# Patient Record
Sex: MALE | Race: WHITE | NOT HISPANIC OR LATINO | Employment: UNEMPLOYED | URBAN - METROPOLITAN AREA
[De-identification: names, ages, dates, MRNs, and addresses within clinical notes are randomized per-mention and may not be internally consistent; named-entity substitution may affect disease eponyms.]

---

## 2021-01-01 ENCOUNTER — TRANSFERRED RECORDS (OUTPATIENT)
Dept: MULTI SPECIALTY CLINIC | Facility: CLINIC | Age: 56
End: 2021-01-01
Payer: COMMERCIAL

## 2021-01-01 ENCOUNTER — PRE VISIT (OUTPATIENT)
Dept: SURGERY | Facility: CLINIC | Age: 56
End: 2021-01-01

## 2021-01-01 ENCOUNTER — PATIENT OUTREACH (OUTPATIENT)
Dept: SURGERY | Facility: CLINIC | Age: 56
End: 2021-01-01

## 2021-01-01 ENCOUNTER — TELEPHONE (OUTPATIENT)
Dept: SURGERY | Facility: CLINIC | Age: 56
End: 2021-01-01

## 2021-01-01 ENCOUNTER — LAB (OUTPATIENT)
Dept: LAB | Facility: CLINIC | Age: 56
End: 2021-01-01

## 2021-01-01 ENCOUNTER — HOSPITAL ENCOUNTER (OUTPATIENT)
Facility: CLINIC | Age: 56
Discharge: HOME | End: 2021-12-27
Attending: STUDENT IN AN ORGANIZED HEALTH CARE EDUCATION/TRAINING PROGRAM | Admitting: STUDENT IN AN ORGANIZED HEALTH CARE EDUCATION/TRAINING PROGRAM
Payer: COMMERCIAL

## 2021-01-01 ENCOUNTER — ANESTHESIA (OUTPATIENT)
Dept: SURGERY | Facility: CLINIC | Age: 56
End: 2021-01-01
Payer: COMMERCIAL

## 2021-01-01 ENCOUNTER — ANESTHESIA EVENT (OUTPATIENT)
Dept: SURGERY | Facility: CLINIC | Age: 56
End: 2021-01-01
Payer: COMMERCIAL

## 2021-01-01 ENCOUNTER — APPOINTMENT (OUTPATIENT)
Dept: GENERAL RADIOLOGY | Facility: CLINIC | Age: 56
End: 2021-01-01
Attending: STUDENT IN AN ORGANIZED HEALTH CARE EDUCATION/TRAINING PROGRAM
Payer: COMMERCIAL

## 2021-01-01 ENCOUNTER — PREP FOR PROCEDURE (OUTPATIENT)
Dept: SURGERY | Facility: CLINIC | Age: 56
End: 2021-01-01

## 2021-01-01 ENCOUNTER — OFFICE VISIT (OUTPATIENT)
Dept: SURGERY | Facility: CLINIC | Age: 56
End: 2021-01-01
Attending: STUDENT IN AN ORGANIZED HEALTH CARE EDUCATION/TRAINING PROGRAM
Payer: COMMERCIAL

## 2021-01-01 ENCOUNTER — ANCILLARY PROCEDURE (OUTPATIENT)
Dept: CT IMAGING | Facility: CLINIC | Age: 56
End: 2021-01-01
Attending: CLINICAL NURSE SPECIALIST

## 2021-01-01 VITALS
RESPIRATION RATE: 16 BRPM | TEMPERATURE: 98.9 F | WEIGHT: 137.57 LBS | HEIGHT: 65 IN | BODY MASS INDEX: 22.92 KG/M2 | OXYGEN SATURATION: 97 % | HEART RATE: 75 BPM | SYSTOLIC BLOOD PRESSURE: 141 MMHG | DIASTOLIC BLOOD PRESSURE: 94 MMHG

## 2021-01-01 VITALS
DIASTOLIC BLOOD PRESSURE: 68 MMHG | WEIGHT: 141.1 LBS | HEART RATE: 63 BPM | HEIGHT: 65 IN | OXYGEN SATURATION: 100 % | SYSTOLIC BLOOD PRESSURE: 146 MMHG | TEMPERATURE: 98.2 F | RESPIRATION RATE: 18 BRPM | BODY MASS INDEX: 23.51 KG/M2

## 2021-01-01 DIAGNOSIS — Z11.59 ENCOUNTER FOR SCREENING FOR OTHER VIRAL DISEASES: ICD-10-CM

## 2021-01-01 DIAGNOSIS — R91.1 LUNG NODULE: Primary | ICD-10-CM

## 2021-01-01 DIAGNOSIS — C34.80 MALIGNANT NEOPLASM OF OVERLAPPING SITES OF UNSPECIFIED BRONCHUS AND LUNG (H): Primary | ICD-10-CM

## 2021-01-01 DIAGNOSIS — C34.80 MALIGNANT NEOPLASM OF OVERLAPPING SITES OF UNSPECIFIED BRONCHUS AND LUNG (H): ICD-10-CM

## 2021-01-01 LAB
ABO/RH(D): NORMAL
ANTIBODY SCREEN: NEGATIVE
BLD PROD TYP BPU: NORMAL
BLD PROD TYP BPU: NORMAL
BLOOD COMPONENT TYPE: NORMAL
BLOOD COMPONENT TYPE: NORMAL
CODING SYSTEM: NORMAL
CODING SYSTEM: NORMAL
CREATININE (EXTERNAL): 0.8 MG/DL (ref 0.7–1.2)
CROSSMATCH: NORMAL
CROSSMATCH: NORMAL
GFR ESTIMATED (EXTERNAL): >60 ML/MIN/1.73 M2
GLUCOSE (EXTERNAL): 121 MG/DL (ref 74–109)
GLUCOSE BLDC GLUCOMTR-MCNC: 94 MG/DL (ref 70–99)
HOLD SPECIMEN: NORMAL
INR (EXTERNAL): 1.2 (ref 2–3)
INTERPRETATION: NORMAL
ISSUE DATE AND TIME: NORMAL
ISSUE DATE AND TIME: NORMAL
LAB DIRECTOR COMMENTS: NORMAL
LAB DIRECTOR DISCLAIMER: NORMAL
LAB DIRECTOR INTERPRETATION: NORMAL
LAB DIRECTOR METHODOLOGY: NORMAL
LAB DIRECTOR RESULTS: NORMAL
PATH REPORT.COMMENTS IMP SPEC: NORMAL
PATH REPORT.FINAL DX SPEC: NORMAL
PATH REPORT.GROSS SPEC: NORMAL
PATH REPORT.MICROSCOPIC SPEC OTHER STN: NORMAL
PATH REPORT.RELEVANT HX SPEC: NORMAL
PATH REPORT.RELEVANT HX SPEC: NORMAL
PATH REPORT.SITE OF ORIGIN SPEC: NORMAL
POTASSIUM (EXTERNAL): 3.7 MMOL/L (ref 3.5–5)
SIGNIFICANT RESULTS: NORMAL
SPECIMEN DESCRIPTION: NORMAL
SPECIMEN DESCRIPTION: NORMAL
SPECIMEN EXPIRATION DATE: NORMAL
TEST DETAILS, MDL: NORMAL
UNIT ABO/RH: NORMAL
UNIT ABO/RH: NORMAL
UNIT NUMBER: NORMAL
UNIT NUMBER: NORMAL
UNIT STATUS: NORMAL
UNIT STATUS: NORMAL
UNIT TYPE ISBT: 5100
UNIT TYPE ISBT: 5100

## 2021-01-01 PROCEDURE — 250N000011 HC RX IP 250 OP 636: Performed by: REGISTERED NURSE

## 2021-01-01 PROCEDURE — 272N000001 HC OR GENERAL SUPPLY STERILE: Performed by: STUDENT IN AN ORGANIZED HEALTH CARE EDUCATION/TRAINING PROGRAM

## 2021-01-01 PROCEDURE — 999N000065 XR CHEST PORT 1 VIEW

## 2021-01-01 PROCEDURE — G0452 MOLECULAR PATHOLOGY INTERPR: HCPCS | Mod: 26 | Performed by: PATHOLOGY

## 2021-01-01 PROCEDURE — 999N000015 HC STATISTIC ARTERIAL MONITORING DAILY

## 2021-01-01 PROCEDURE — 88312 SPECIAL STAINS GROUP 1: CPT | Mod: 26 | Performed by: PATHOLOGY

## 2021-01-01 PROCEDURE — G0463 HOSPITAL OUTPT CLINIC VISIT: HCPCS

## 2021-01-01 PROCEDURE — 88331 PATH CONSLTJ SURG 1 BLK 1SPC: CPT | Mod: 26 | Performed by: PATHOLOGY

## 2021-01-01 PROCEDURE — 250N000011 HC RX IP 250 OP 636: Performed by: STUDENT IN AN ORGANIZED HEALTH CARE EDUCATION/TRAINING PROGRAM

## 2021-01-01 PROCEDURE — 999N000141 HC STATISTIC PRE-PROCEDURE NURSING ASSESSMENT: Performed by: STUDENT IN AN ORGANIZED HEALTH CARE EDUCATION/TRAINING PROGRAM

## 2021-01-01 PROCEDURE — 88305 TISSUE EXAM BY PATHOLOGIST: CPT | Mod: TC | Performed by: STUDENT IN AN ORGANIZED HEALTH CARE EDUCATION/TRAINING PROGRAM

## 2021-01-01 PROCEDURE — 88341 IMHCHEM/IMCYTCHM EA ADD ANTB: CPT | Mod: 26 | Performed by: PATHOLOGY

## 2021-01-01 PROCEDURE — 88305 TISSUE EXAM BY PATHOLOGIST: CPT | Mod: 26 | Performed by: PATHOLOGY

## 2021-01-01 PROCEDURE — 999N000157 HC STATISTIC RCP TIME EA 10 MIN

## 2021-01-01 PROCEDURE — 710N000010 HC RECOVERY PHASE 1, LEVEL 2, PER MIN: Performed by: STUDENT IN AN ORGANIZED HEALTH CARE EDUCATION/TRAINING PROGRAM

## 2021-01-01 PROCEDURE — 82962 GLUCOSE BLOOD TEST: CPT

## 2021-01-01 PROCEDURE — 81445 SO NEO GSAP 5-50DNA/DNA&RNA: CPT | Performed by: STUDENT IN AN ORGANIZED HEALTH CARE EDUCATION/TRAINING PROGRAM

## 2021-01-01 PROCEDURE — 86850 RBC ANTIBODY SCREEN: CPT | Performed by: ANESTHESIOLOGY

## 2021-01-01 PROCEDURE — 88342 IMHCHEM/IMCYTCHM 1ST ANTB: CPT | Mod: 26 | Performed by: PATHOLOGY

## 2021-01-01 PROCEDURE — 99204 OFFICE O/P NEW MOD 45 MIN: CPT | Performed by: STUDENT IN AN ORGANIZED HEALTH CARE EDUCATION/TRAINING PROGRAM

## 2021-01-01 PROCEDURE — 250N000009 HC RX 250: Performed by: ANESTHESIOLOGY

## 2021-01-01 PROCEDURE — 250N000013 HC RX MED GY IP 250 OP 250 PS 637: Performed by: SURGERY

## 2021-01-01 PROCEDURE — 88321 CONSLTJ&REPRT SLD PREP ELSWR: CPT | Performed by: PATHOLOGY

## 2021-01-01 PROCEDURE — 258N000003 HC RX IP 258 OP 636: Performed by: REGISTERED NURSE

## 2021-01-01 PROCEDURE — 250N000009 HC RX 250: Performed by: REGISTERED NURSE

## 2021-01-01 PROCEDURE — 71250 CT THORAX DX C-: CPT | Mod: GC | Performed by: RADIOLOGY

## 2021-01-01 PROCEDURE — P9016 RBC LEUKOCYTES REDUCED: HCPCS | Performed by: STUDENT IN AN ORGANIZED HEALTH CARE EDUCATION/TRAINING PROGRAM

## 2021-01-01 PROCEDURE — 360N000077 HC SURGERY LEVEL 4, PER MIN: Performed by: STUDENT IN AN ORGANIZED HEALTH CARE EDUCATION/TRAINING PROGRAM

## 2021-01-01 PROCEDURE — 88360 TUMOR IMMUNOHISTOCHEM/MANUAL: CPT | Mod: 26 | Performed by: PATHOLOGY

## 2021-01-01 PROCEDURE — 258N000003 HC RX IP 258 OP 636: Performed by: ANESTHESIOLOGY

## 2021-01-01 PROCEDURE — 71045 X-RAY EXAM CHEST 1 VIEW: CPT | Mod: 26 | Performed by: RADIOLOGY

## 2021-01-01 PROCEDURE — 250N000011 HC RX IP 250 OP 636: Performed by: ANESTHESIOLOGY

## 2021-01-01 PROCEDURE — 88307 TISSUE EXAM BY PATHOLOGIST: CPT | Mod: 26 | Performed by: PATHOLOGY

## 2021-01-01 PROCEDURE — 39402 MEDIASTINOSCPY W/LMPH NOD BX: CPT | Mod: GC | Performed by: STUDENT IN AN ORGANIZED HEALTH CARE EDUCATION/TRAINING PROGRAM

## 2021-01-01 PROCEDURE — 88365 INSITU HYBRIDIZATION (FISH): CPT | Mod: 26 | Performed by: PATHOLOGY

## 2021-01-01 PROCEDURE — 36415 COLL VENOUS BLD VENIPUNCTURE: CPT | Performed by: ANESTHESIOLOGY

## 2021-01-01 PROCEDURE — 370N000017 HC ANESTHESIA TECHNICAL FEE, PER MIN: Performed by: STUDENT IN AN ORGANIZED HEALTH CARE EDUCATION/TRAINING PROGRAM

## 2021-01-01 PROCEDURE — 710N000012 HC RECOVERY PHASE 2, PER MINUTE: Performed by: STUDENT IN AN ORGANIZED HEALTH CARE EDUCATION/TRAINING PROGRAM

## 2021-01-01 PROCEDURE — 86923 COMPATIBILITY TEST ELECTRIC: CPT | Performed by: STUDENT IN AN ORGANIZED HEALTH CARE EDUCATION/TRAINING PROGRAM

## 2021-01-01 PROCEDURE — 88331 PATH CONSLTJ SURG 1 BLK 1SPC: CPT | Mod: TC,XS | Performed by: STUDENT IN AN ORGANIZED HEALTH CARE EDUCATION/TRAINING PROGRAM

## 2021-01-01 PROCEDURE — 88323 CONSLTJ&REPRT MATRL PREP SLD: CPT | Mod: TC | Performed by: STUDENT IN AN ORGANIZED HEALTH CARE EDUCATION/TRAINING PROGRAM

## 2021-01-01 PROCEDURE — 250N000025 HC SEVOFLURANE, PER MIN: Performed by: STUDENT IN AN ORGANIZED HEALTH CARE EDUCATION/TRAINING PROGRAM

## 2021-01-01 RX ORDER — ONDANSETRON 4 MG/1
4 TABLET, ORALLY DISINTEGRATING ORAL EVERY 30 MIN PRN
Status: DISCONTINUED | OUTPATIENT
Start: 2021-01-01 | End: 2021-01-01 | Stop reason: HOSPADM

## 2021-01-01 RX ORDER — ACETAMINOPHEN 325 MG/1
650 TABLET ORAL
Status: DISCONTINUED | OUTPATIENT
Start: 2021-01-01 | End: 2021-01-01 | Stop reason: HOSPADM

## 2021-01-01 RX ORDER — CEFAZOLIN SODIUM 2 G/100ML
2 INJECTION, SOLUTION INTRAVENOUS
Status: COMPLETED | OUTPATIENT
Start: 2021-01-01 | End: 2021-01-01

## 2021-01-01 RX ORDER — ACETAMINOPHEN 325 MG/1
650 TABLET ORAL EVERY 4 HOURS PRN
Qty: 50 TABLET | Refills: 0 | Status: SHIPPED | OUTPATIENT
Start: 2021-01-01

## 2021-01-01 RX ORDER — SODIUM CHLORIDE, SODIUM LACTATE, POTASSIUM CHLORIDE, CALCIUM CHLORIDE 600; 310; 30; 20 MG/100ML; MG/100ML; MG/100ML; MG/100ML
INJECTION, SOLUTION INTRAVENOUS CONTINUOUS PRN
Status: DISCONTINUED | OUTPATIENT
Start: 2021-01-01 | End: 2021-01-01

## 2021-01-01 RX ORDER — LIDOCAINE 40 MG/G
CREAM TOPICAL
Status: DISCONTINUED | OUTPATIENT
Start: 2021-01-01 | End: 2021-01-01 | Stop reason: HOSPADM

## 2021-01-01 RX ORDER — OXYCODONE HYDROCHLORIDE 5 MG/1
5 TABLET ORAL
Status: COMPLETED | OUTPATIENT
Start: 2021-01-01 | End: 2021-01-01

## 2021-01-01 RX ORDER — PROPOFOL 10 MG/ML
INJECTION, EMULSION INTRAVENOUS PRN
Status: DISCONTINUED | OUTPATIENT
Start: 2021-01-01 | End: 2021-01-01

## 2021-01-01 RX ORDER — CITALOPRAM HYDROBROMIDE 20 MG/1
20 TABLET ORAL
COMMUNITY

## 2021-01-01 RX ORDER — OXYCODONE HYDROCHLORIDE 5 MG/1
5 TABLET ORAL EVERY 4 HOURS PRN
Status: DISCONTINUED | OUTPATIENT
Start: 2021-01-01 | End: 2021-01-01 | Stop reason: HOSPADM

## 2021-01-01 RX ORDER — FENTANYL CITRATE 50 UG/ML
INJECTION, SOLUTION INTRAMUSCULAR; INTRAVENOUS PRN
Status: DISCONTINUED | OUTPATIENT
Start: 2021-01-01 | End: 2021-01-01

## 2021-01-01 RX ORDER — CEFAZOLIN SODIUM 2 G/50ML
2 SOLUTION INTRAVENOUS SEE ADMIN INSTRUCTIONS
Status: CANCELLED | OUTPATIENT
Start: 2021-01-01

## 2021-01-01 RX ORDER — OXYCODONE HYDROCHLORIDE 5 MG/1
5-10 TABLET ORAL EVERY 4 HOURS PRN
Qty: 20 TABLET | Refills: 0 | Status: SHIPPED | OUTPATIENT
Start: 2021-01-01

## 2021-01-01 RX ORDER — KETAMINE HYDROCHLORIDE 10 MG/ML
INJECTION INTRAMUSCULAR; INTRAVENOUS PRN
Status: DISCONTINUED | OUTPATIENT
Start: 2021-01-01 | End: 2021-01-01

## 2021-01-01 RX ORDER — ONDANSETRON 2 MG/ML
INJECTION INTRAMUSCULAR; INTRAVENOUS PRN
Status: DISCONTINUED | OUTPATIENT
Start: 2021-01-01 | End: 2021-01-01

## 2021-01-01 RX ORDER — FENTANYL CITRATE 50 UG/ML
25 INJECTION, SOLUTION INTRAMUSCULAR; INTRAVENOUS EVERY 5 MIN PRN
Status: DISCONTINUED | OUTPATIENT
Start: 2021-01-01 | End: 2021-01-01 | Stop reason: HOSPADM

## 2021-01-01 RX ORDER — EPHEDRINE SULFATE 50 MG/ML
INJECTION, SOLUTION INTRAMUSCULAR; INTRAVENOUS; SUBCUTANEOUS PRN
Status: DISCONTINUED | OUTPATIENT
Start: 2021-01-01 | End: 2021-01-01

## 2021-01-01 RX ORDER — GLYCOPYRROLATE 0.2 MG/ML
INJECTION, SOLUTION INTRAMUSCULAR; INTRAVENOUS PRN
Status: DISCONTINUED | OUTPATIENT
Start: 2021-01-01 | End: 2021-01-01

## 2021-01-01 RX ORDER — CEFAZOLIN SODIUM 2 G/50ML
2 SOLUTION INTRAVENOUS
Status: CANCELLED | OUTPATIENT
Start: 2021-01-01

## 2021-01-01 RX ORDER — DEXAMETHASONE SODIUM PHOSPHATE 4 MG/ML
INJECTION, SOLUTION INTRA-ARTICULAR; INTRALESIONAL; INTRAMUSCULAR; INTRAVENOUS; SOFT TISSUE PRN
Status: DISCONTINUED | OUTPATIENT
Start: 2021-01-01 | End: 2021-01-01

## 2021-01-01 RX ORDER — CEFAZOLIN SODIUM 2 G/100ML
2 INJECTION, SOLUTION INTRAVENOUS SEE ADMIN INSTRUCTIONS
Status: DISCONTINUED | OUTPATIENT
Start: 2021-01-01 | End: 2021-01-01 | Stop reason: HOSPADM

## 2021-01-01 RX ORDER — HYDROMORPHONE HYDROCHLORIDE 1 MG/ML
0.2 INJECTION, SOLUTION INTRAMUSCULAR; INTRAVENOUS; SUBCUTANEOUS EVERY 5 MIN PRN
Status: DISCONTINUED | OUTPATIENT
Start: 2021-01-01 | End: 2021-01-01 | Stop reason: HOSPADM

## 2021-01-01 RX ORDER — ONDANSETRON 2 MG/ML
4 INJECTION INTRAMUSCULAR; INTRAVENOUS EVERY 30 MIN PRN
Status: DISCONTINUED | OUTPATIENT
Start: 2021-01-01 | End: 2021-01-01 | Stop reason: HOSPADM

## 2021-01-01 RX ORDER — SODIUM CHLORIDE, SODIUM LACTATE, POTASSIUM CHLORIDE, CALCIUM CHLORIDE 600; 310; 30; 20 MG/100ML; MG/100ML; MG/100ML; MG/100ML
INJECTION, SOLUTION INTRAVENOUS CONTINUOUS
Status: DISCONTINUED | OUTPATIENT
Start: 2021-01-01 | End: 2021-01-01 | Stop reason: HOSPADM

## 2021-01-01 RX ORDER — LIDOCAINE HYDROCHLORIDE 20 MG/ML
INJECTION, SOLUTION INFILTRATION; PERINEURAL PRN
Status: DISCONTINUED | OUTPATIENT
Start: 2021-01-01 | End: 2021-01-01

## 2021-01-01 RX ADMIN — FENTANYL CITRATE 100 MCG: 50 INJECTION, SOLUTION INTRAMUSCULAR; INTRAVENOUS at 10:54

## 2021-01-01 RX ADMIN — ROCURONIUM BROMIDE 50 MG: 50 INJECTION, SOLUTION INTRAVENOUS at 10:24

## 2021-01-01 RX ADMIN — FENTANYL CITRATE 25 MCG: 50 INJECTION, SOLUTION INTRAMUSCULAR; INTRAVENOUS at 14:27

## 2021-01-01 RX ADMIN — ONDANSETRON 4 MG: 2 INJECTION INTRAMUSCULAR; INTRAVENOUS at 13:12

## 2021-01-01 RX ADMIN — HYDROMORPHONE HYDROCHLORIDE 0.2 MG: 1 INJECTION, SOLUTION INTRAMUSCULAR; INTRAVENOUS; SUBCUTANEOUS at 14:24

## 2021-01-01 RX ADMIN — ROCURONIUM BROMIDE 20 MG: 50 INJECTION, SOLUTION INTRAVENOUS at 11:44

## 2021-01-01 RX ADMIN — Medication 10 MG: at 11:34

## 2021-01-01 RX ADMIN — SODIUM CHLORIDE, POTASSIUM CHLORIDE, SODIUM LACTATE AND CALCIUM CHLORIDE: 600; 310; 30; 20 INJECTION, SOLUTION INTRAVENOUS at 10:08

## 2021-01-01 RX ADMIN — ROCURONIUM BROMIDE 10 MG: 50 INJECTION, SOLUTION INTRAVENOUS at 13:34

## 2021-01-01 RX ADMIN — PHENYLEPHRINE HYDROCHLORIDE 50 MCG: 10 INJECTION INTRAVENOUS at 13:13

## 2021-01-01 RX ADMIN — PROPOFOL 140 MG: 10 INJECTION, EMULSION INTRAVENOUS at 10:24

## 2021-01-01 RX ADMIN — PHENYLEPHRINE HYDROCHLORIDE 50 MCG: 10 INJECTION INTRAVENOUS at 12:36

## 2021-01-01 RX ADMIN — HYDROMORPHONE HYDROCHLORIDE 0.2 MG: 1 INJECTION, SOLUTION INTRAMUSCULAR; INTRAVENOUS; SUBCUTANEOUS at 14:52

## 2021-01-01 RX ADMIN — Medication 30 MG: at 10:24

## 2021-01-01 RX ADMIN — ROCURONIUM BROMIDE 20 MG: 50 INJECTION, SOLUTION INTRAVENOUS at 12:41

## 2021-01-01 RX ADMIN — Medication 10 MG: at 12:43

## 2021-01-01 RX ADMIN — PROPOFOL 30 MG: 10 INJECTION, EMULSION INTRAVENOUS at 10:18

## 2021-01-01 RX ADMIN — PHENYLEPHRINE HYDROCHLORIDE 100 MCG: 10 INJECTION INTRAVENOUS at 10:42

## 2021-01-01 RX ADMIN — REMIFENTANIL HYDROCHLORIDE 0.02 MCG/KG/MIN: 1 INJECTION, POWDER, LYOPHILIZED, FOR SOLUTION INTRAVENOUS at 10:36

## 2021-01-01 RX ADMIN — ENOXAPARIN SODIUM 40 MG: 40 INJECTION SUBCUTANEOUS at 08:46

## 2021-01-01 RX ADMIN — SUGAMMADEX 200 MG: 100 INJECTION, SOLUTION INTRAVENOUS at 14:08

## 2021-01-01 RX ADMIN — Medication 5 MG: at 11:00

## 2021-01-01 RX ADMIN — LIDOCAINE HYDROCHLORIDE 100 MG: 20 INJECTION, SOLUTION INFILTRATION; PERINEURAL at 10:24

## 2021-01-01 RX ADMIN — ROCURONIUM BROMIDE 30 MG: 50 INJECTION, SOLUTION INTRAVENOUS at 10:51

## 2021-01-01 RX ADMIN — ACETAMINOPHEN 650 MG: 325 TABLET, FILM COATED ORAL at 14:50

## 2021-01-01 RX ADMIN — OXYCODONE HYDROCHLORIDE 5 MG: 5 TABLET ORAL at 14:50

## 2021-01-01 RX ADMIN — DEXAMETHASONE SODIUM PHOSPHATE 8 MG: 4 INJECTION, SOLUTION INTRA-ARTICULAR; INTRALESIONAL; INTRAMUSCULAR; INTRAVENOUS; SOFT TISSUE at 10:52

## 2021-01-01 RX ADMIN — FENTANYL CITRATE 25 MCG: 50 INJECTION, SOLUTION INTRAMUSCULAR; INTRAVENOUS at 15:22

## 2021-01-01 RX ADMIN — GLYCOPYRROLATE 0.2 MG: 0.2 INJECTION, SOLUTION INTRAMUSCULAR; INTRAVENOUS at 11:09

## 2021-01-01 RX ADMIN — MIDAZOLAM 2 MG: 1 INJECTION INTRAMUSCULAR; INTRAVENOUS at 09:50

## 2021-01-01 RX ADMIN — CEFAZOLIN 2 G: 10 INJECTION, POWDER, FOR SOLUTION INTRAVENOUS at 10:43

## 2021-01-01 RX ADMIN — FENTANYL CITRATE 100 MCG: 50 INJECTION, SOLUTION INTRAMUSCULAR; INTRAVENOUS at 10:24

## 2021-01-01 ASSESSMENT — MIFFLIN-ST. JEOR
SCORE: 1380.26
SCORE: 1396.91

## 2021-01-01 ASSESSMENT — PAIN SCALES - GENERAL: PAINLEVEL: MODERATE PAIN (4)

## 2021-12-03 ENCOUNTER — TRANSFERRED RECORDS (OUTPATIENT)
Dept: HEALTH INFORMATION MANAGEMENT | Facility: CLINIC | Age: 56
End: 2021-12-03

## 2021-12-06 ENCOUNTER — TRANSCRIBE ORDERS (OUTPATIENT)
Dept: OTHER | Age: 56
End: 2021-12-06

## 2021-12-06 ENCOUNTER — DOCUMENTATION ONLY (OUTPATIENT)
Dept: ONCOLOGY | Facility: CLINIC | Age: 56
End: 2021-12-06

## 2021-12-06 DIAGNOSIS — C34.80 MALIGNANT NEOPLASM OF OVERLAPPING SITES OF UNSPECIFIED BRONCHUS AND LUNG (H): Primary | ICD-10-CM

## 2021-12-06 NOTE — PROGRESS NOTES
Action 2021 10:54 AM ABT   [facility] for upload     Action 2021 1:57 PM ABT   Action Taken Imaging disc request sent to [facility] for CT Chest 10/21/21  [employer] Track[employer]    Image request sent to [facility] for 17: CT Angio      Action 2021 10:09 AM ABT   Action Taken Images from [facility] received and resolved to PACS.    Called and spoke to at [facility] in regards to imaging disc. Radiology team not available. Left  for Radiology team to call back in regards to status on img disc.    11:45 AM  [facility] and stated they did not receive any requests but will overnight imaging disc with 10/21/21 CT Chest    12:25 PM  [facility] from [facility] received and sent to [facility] for upload.

## 2021-12-14 NOTE — TELEPHONE ENCOUNTER
RECORDS STATUS - ALL OTHER DIAGNOSIS      Action    Action Taken 21  Spoke [facility] Rad: per them, an image disc was overnighted to us this morning. They did not have a tracking number, but indicated the Mail Code of [id number].       RECORDS RECEIVED FROM: MICHAEL Manzanares   DATE RECEIVED:    NOTES STATUS DETAILS   OFFICE NOTE from [facility] provider [facility]    OFFICE NOTE from medical oncologist     DISCHARGE SUMMARY from hospital     DISCHARGE REPORT from the ER     OPERATIVE REPORT CE - [facility] 21: EBUS   MEDICATION LIST     CLINICAL TRIAL TREATMENTS TO DATE     LABS     PATHOLOGY REPORTS Glenna, Report in CE, Slides requested   [employer] Track[employer] 21: [id number]   ANYTHING RELATED TO DIAGNOSIS     GENONOMIC TESTING     TYPE:     IMAGING (NEED IMAGES & REPORT)     CT SCANS Requested 10/21/21, [facility]   MRI     MAMMO     ULTRASOUND     PET PACS 10/28/21: [facility]

## 2021-12-16 NOTE — NURSING NOTE
"Oncology Rooming Note    December 16, 2021 12:03 PM   Satnam Ibarra is a 56 year old male who presents for:    Chief Complaint   Patient presents with     Oncology Clinic Visit     Pt is here for a New Eval for Malignant Neoplasm Lung      Initial Vitals: Blood Pressure (Abnormal) 146/68   Pulse 63   Temperature 98.2  F (36.8  C) (Oral)   Respiration 18   Height 1.651 m (5' 5\")   Weight 64 kg (141 lb 1.6 oz)   Oxygen Saturation 100%   Body Mass Index 23.48 kg/m   Estimated body mass index is 23.48 kg/m  as calculated from the following:    Height as of this encounter: 1.651 m (5' 5\").    Weight as of this encounter: 64 kg (141 lb 1.6 oz). Body surface area is 1.71 meters squared.  Moderate Pain (4) Comment: Trunk of body   No LMP for male patient.  Allergies reviewed: Yes  Medications reviewed: Yes    Medications: Medication refills not needed today.  Pharmacy name entered into EPIC: Data Unavailable    Clinical concerns: none       Angelica Rowland MA            "

## 2021-12-16 NOTE — LETTER
12/16/2021         RE: Satanm Ibarra  [id number] [address] [address]        Dear Colleague,    Thank you for referring your patient, Satnam Ibarra, to the [facility]. Please see a copy of my visit note below.    THORACIC SURGERY - NEW PATIENT OFFICE VISIT          I saw Fred in consultation for the evaluation and treatment of a RUL lung nodule with mediastinal adenopathy.     HPI  Mr. Satnam Ibarra is a 56 year old man with a newly diagnosed lung cancer with mediastinal adenopathy. His main complaints of chest and neck pain had prompted a CT scan which led to the findings of the lung nodule and nodes. A bronchoscopic biopsy of the 4R node was diagnostic of cancer but the type hasnt been defined.  He is here today to discuss obtaining more tissue for diagnosis and treatment planning.  He is quite independednt and functional. He does get dyspneic on exertion.                                   Previsit Tests   PET 11/21: avid RUL nodule and mediastinal nodes    Pathology: thoracic SMARCA4 deficient undifferentiated tumor    PMH      No past medical history on file.     PSH      No past surgical history on file.     ETOH quit 2 yrs ago  TOB1 PPD    Physical examination  BMI 23   no obvious [id number]    From a personal perspective, he lives alone and has no immediate family contact    IMPRESSION   56 year old year-old male with likely advanced lung cancer/thoracic malignancy    PLAN  I spent 45 min on the date of the encounter in chart review, patient visit, review of tests, documentation and/or discussion with other providers about the issues documented above. I reviewed the plan as follows:  Procedure planned: TEMLA   I reviewed the indications, risks, and benefits of the procedure with Mr. Satnam Ibarra. We discussed the intraoperative risks of bleeding and injury to vital organs, potential postoperative complications including, but not limited to, major  respiratory events, arrhythmia, bleeding needing sternotomy, infection, reoperation, and death. I explained the anticipated hospital course (+/- 0-2 days) and postoperative recovery including pain control, possible chest drain management, and variable degrees of dyspnea (or need for supplemental oxygen) and fatigue that tend to get better with time.  .  Consent: pending   Necessary Preop Tests & Appointments:   PAC  PFTs    Regional Anesthesia Plan:   Anticoagulation Plan: lovenox in holding and post op   Smoking Cessation:   Mr. Ibarra was counseled on the importance of smoking cessation and its impact on the tawanna-operative course. The patient is strongly encouraged to quit smoking for 3 weeks prior to their procedure. If they feel they're absolutely unable to quit, we will proceed as planned given the malignant nature of their disease.  This guideline is in accordance with the World Health Organization (WHO) and has shown to lower the risk of both surgical and anesthesia complications as well as improve post-operative outcomes.     All questions were answered and Satnam Ibarra and present family were in agreement with the plan.  I appreciate the opportunity to participate in the care of your patient and will keep you updated.  Sincerely,    Shyanne Eduardo MD               Again, thank you for allowing me to participate in the care of your patient.        Sincerely,        Shyanne Eduardo MD

## 2021-12-17 NOTE — PROGRESS NOTES
THORACIC SURGERY - NEW PATIENT OFFICE VISIT          I saw Fred in consultation for the evaluation and treatment of a RUL lung nodule with mediastinal adenopathy.     HPI  Mr. Satnam Ibarra is a 56 year old man with a newly diagnosed lung cancer with mediastinal adenopathy. His main complaints of chest and neck pain had prompted a CT scan which led to the findings of the lung nodule and nodes. A bronchoscopic biopsy of the 4R node was diagnostic of cancer but the type hasnt been defined.  He is here today to discuss obtaining more tissue for diagnosis and treatment planning.  He is quite independednt and functional. He does get dyspneic on exertion.                                   Previsit Tests   PET 11/21: avid RUL nodule and mediastinal nodes    Pathology: thoracic SMARCA4 deficient undifferentiated tumor    PMH      No past medical history on file.     PSH      No past surgical history on file.     ETOH quit 2 yrs ago  TOB1 PPD    Physical examination  BMI 23   no obvious [id number]    From a personal perspective, he lives alone and has no immediate family contact    IMPRESSION   56 year old year-old male with likely advanced lung cancer/thoracic malignancy    PLAN  I spent 45 min on the date of the encounter in chart review, patient visit, review of tests, documentation and/or discussion with other providers about the issues documented above. I reviewed the plan as follows:  Procedure planned: TEMLA   I reviewed the indications, risks, and benefits of the procedure with Mr. Satnam Ibarra. We discussed the intraoperative risks of bleeding and injury to vital organs, potential postoperative complications including, but not limited to, major respiratory events, arrhythmia, bleeding needing sternotomy, infection, reoperation, and death. I explained the anticipated hospital course (+/- 0-2 days) and postoperative recovery including pain control, possible chest drain management, and variable degrees of  dyspnea (or need for supplemental oxygen) and fatigue that tend to get better with time.  .  Consent: pending   Necessary Preop Tests & Appointments:   PAC  PFTs    Regional Anesthesia Plan:   Anticoagulation Plan: lovenox in holding and post op   Smoking Cessation:   Mr. Ibarra was counseled on the importance of smoking cessation and its impact on the tawanna-operative course. The patient is strongly encouraged to quit smoking for 3 weeks prior to their procedure. If they feel they're absolutely unable to quit, we will proceed as planned given the malignant nature of their disease.  This guideline is in accordance with the World Health Organization (WHO) and has shown to lower the risk of both surgical and anesthesia complications as well as improve post-operative outcomes.     All questions were answered and Satnam Ibarra and present family were in agreement with the plan.  I appreciate the opportunity to participate in the care of your patient and will keep you updated.  Sincerely,    Shyanne Eduardo MD

## 2021-12-21 NOTE — TELEPHONE ENCOUNTER
"Patient scheduled to have Thoracic Surgery Monday December 27 at [facility] with Dr Eduardo.  At time surgery was scheduled, patient requested to have pre-operative H&P and Covid tests completed locally, patient lives 4 hours away. Writer followed up with patient to check on status of pre-operative H&P and Covid Test appointments.  Patient reports he has labs, pre-op H&P and Covid test scheduled locally at [facility] for Thursday December 23.    During conversation patient verbalized concerns he has regarding anesthesia and patient's sobriety.  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 Patient reports during a previous procedure, it took \"forever to come out of anesthesia, he doesn't remember much if anything, even after he was home\". Patient states he doesn't want to be \"over juiced\" or have anything done that could affect his sobriety.  Writer updated the Surgery Scheduler, who reported that they will update the Case report to include this information.  Writer also updated patient's surgeon Dr Eduardo.  Dr Eduardo requested patient's PCP be informed and information reviewed with patient and be included in patient's Pre-op H&P.  Writer will contact patient's PCP.   "

## 2021-12-21 NOTE — TELEPHONE ENCOUNTER
Spoke with patient to schedule procedure with Dr. Shyanne Eduardo    Procedure was scheduled on 12/27 at [facility] OR  [facility] [place] [facility]    Patient is aware a COVID-19 test is needed before their procedure. The test should be with-in 4 days of their procedure.   Test Details: [facility]uled at [facility] [place] on [facility]    Patient is aware a / is needed day of surgery.   Patient has my direct contact information for any further questions.

## 2021-12-22 NOTE — TELEPHONE ENCOUNTER
Writer reached out to [facility] to get in touch with Patient's PCP per Dr Eduardo's request. Pt's PCP is Dr Paul Sharma. Writer updated staff in Dr Sharma's office of patient's concerns and Dr Eduardo's request that they be discussed at pre-op H&P visit. Note from 12/21/2021 with patient's concerns documented faxed to Dr Sharma's office at [phone].

## 2021-12-24 NOTE — TELEPHONE ENCOUNTER
Upon reviewing patient's Pre-operative H&P that was completed by Dr Paul Sharma at the [facility] December 23, it was noted that patient uses cannabis/marijuana daily. Writer attempted to reach out to patient to inform him that it is crucial for him to Refrain from any cannabis/marijuana use a minimum of 48 hours prior to surgery.  No answer when called, left voicemail. Due to the holiday and surgery scheduled for Monday, writer Sent email with this information to patient's email address, [email or web address]  Writer called patient and left another voicemail stating email was sent and encouraged patient to review email.   
Yes

## 2021-12-27 NOTE — ANESTHESIA PROCEDURE NOTES
Airway       Patient location during procedure: OR       Procedure Start/Stop Times: 12/27/2021 10:27 AM  Staff -        Anesthesiologist:  Santino Connors MD       CRNA: Sweta Romero APRN CRNA       Performed By: CRNAIndications and Patient Condition       Indications for airway management: tawanna-procedural       Induction type:intravenous       Mask difficulty assessment: 2 - vent by mask + OA or adjuvant +/- NMBA    Final Airway Details       Final airway type: endotracheal airway       Successful airway: ETT - single  Endotracheal Airway Details        ETT size (mm): 7.5       Cuffed: yes       Successful intubation technique: direct laryngoscopy       DL Blade Type: MAC 4       Grade View of Cords: 2       Adjucts: stylet       Position: Left       Measured from: lips       Secured at (cm): 22       Bite block used: None    Post intubation assessment        Placement verified by: capnometry, equal breath sounds and chest rise        Number of attempts at approach: 1       Number of other approaches attempted: 0       Secured with: pink tape       Ease of procedure: easy       Dentition: Intact and Unchanged

## 2021-12-27 NOTE — ANESTHESIA PREPROCEDURE EVALUATION
Anesthesia Pre-Procedure Evaluation    Patient: Satnam Ibarra   MRN: [id number] : 1965        Preoperative Diagnosis: Lung nodule [R91.1]    Procedure : Procedure(s):  LYMPHADENECTOMY, MEDIASTINUM, EXTENDED, TRANSCERVICAL APPROACH          Past Medical History:   Diagnosis Date     Anxiety      Depression       Past Surgical History:   Procedure Laterality Date     BACK SURGERY        No Known Allergies   Social History     Tobacco Use     Smoking status: Current Every Day Smoker     Smokeless tobacco: Never Used   Substance Use Topics     Alcohol use: Not Currently     Comment: Sober 2+ years      Wt Readings from Last 1 Encounters:   21 62.4 kg (137 lb 9.1 oz)        Anesthesia Evaluation   Pt has had prior anesthetic. Type: General.    History of anesthetic complications   Slow wake..    ROS/MED HX  ENT/Pulmonary:       Neurologic:       Cardiovascular:       METS/Exercise Tolerance:     Hematologic:       Musculoskeletal:       GI/Hepatic:       Renal/Genitourinary:       Endo:       Psychiatric/Substance Use:       Infectious Disease:       Malignancy:       Other:               OUTSIDE LABS:  CBC: No results found for: WBC, HGB, HCT, PLT  BMP:   Lab Results   Component Value Date    GLC 94 2021     COAGS: No results found for: PTT, INR, FIBR  POC: No results found for: BGM, HCG, HCGS  HEPATIC: No results found for: ALBUMIN, PROTTOTAL, ALT, AST, GGT, ALKPHOS, BILITOTAL, BILIDIRECT, GUERITA  OTHER: No results found for: PH, LACT, A1C, ARIEL, PHOS, MAG, LIPASE, AMYLASE, TSH, T4, T3, CRP, SED    Anesthesia Plan    ASA Status:  3      Anesthesia Type: General.     - Airway: ETT         Techniques and Equipment:     - Lines/Monitors: 2nd IV, Arterial Line     Consents    Anesthesia Plan(s) and associated risks, benefits, and realistic alternatives discussed. Questions answered and patient/representative(s) expressed understanding.    - Discussed:     - Discussed with:  Patient      - Extended  Intubation/Ventilatory Support Discussed: No.      - Patient is DNR/DNI Status: No    Use of blood products discussed: No .     Postoperative Care    Pain management: IV analgesics.   PONV prophylaxis: Ondansetron (or other 5HT-3), Dexamethasone or Solumedrol     Comments:                Santino Connors MD

## 2021-12-27 NOTE — PROVIDER NOTIFICATION
Pt expressing desire to leave. Provider made aware. Md Elvin AGUERO Called back, pt ok to discharge at this time.

## 2021-12-27 NOTE — BRIEF OP NOTE
[facility]    Brief Operative Note    Pre-operative diagnosis: Lung nodule [R91.1]  Post-operative diagnosis Same as pre-operative diagnosis    Procedure: Procedure(s):  TRANSCERVICAL EXTENDED MEDIASTINAL LYMPH NODE BIOPSIES  Surgeon: Surgeon(s) and Role:     * Shyanne Eduardo MD - Primary     * Joby Oconnor MD - Resident - Assisting  Anesthesia: General   Estimated Blood Loss: Less than 50 ml    Drains: None  Specimens:   ID Type Source Tests Collected by Time Destination   1 : LEVEL 7  Tissue Lymph Node(s), Mediastinal, Regional SURGICAL PATHOLOGY EXAM Shyanne Eduardo MD 12/27/2021 11:21 AM    2 : 4R Tissue Lymph Node(s) SURGICAL PATHOLOGY EXAM Shyanne Eduardo MD 12/27/2021 11:38 AM    3 : 4R #2 Tissue Lymph Node(s) SURGICAL PATHOLOGY EXAM Shyanne Eduardo MD 12/27/2021 12:10 PM    4 : 4R No. 3  Tissue Lymph Node(s) SURGICAL PATHOLOGY EXAM Shyanne Eduardo MD 12/27/2021 12:48 PM    5 : 4R No. 4 Tissue Lymph Node(s) SURGICAL PATHOLOGY EXAM Shyanne Eduardo MD 12/27/2021  1:02 PM    6 : 4R No. 4 Tissue Lymph Node(s) SURGICAL PATHOLOGY EXAM Shyanne Eduardo MD 12/27/2021  1:05 PM    7 : 4R No. 5 Tissue Lymph Node(s) SURGICAL PATHOLOGY EXAM Shyanne Eduardo MD 12/27/2021  1:45 PM      Findings:   None.  Complications: None.  Implants: * No implants in log *     Signed:    Joby Oconnor MD 12/27/2021 at 2:08 PM  Cardiothoracic Surgery Fellow  Pager: [phone]

## 2021-12-27 NOTE — ANESTHESIA PROCEDURE NOTES
Arterial Line Procedure Note    Pre-Procedure   Staff -        Anesthesiologist:  Santino Connors MD       Performed By: anesthesiologist       Location: OR       Pre-Anesthestic Checklist: patient identified, IV checked, risks and benefits discussed, informed consent, monitors and equipment checked, pre-op evaluation and at physician/surgeon's request  Timeout:       Correct Patient: Yes        Correct Procedure: Yes        Correct Site: Yes        Correct Position: Yes   Procedure   Procedure: arterial line       Laterality: right       Insertion Site: radial.  Sterile Prep        Standard elements of sterile barrier followed       Skin prep: Chloraprep  Insertion/Injection        Technique: Seldinger Technique        Catheter Type/Size: 20 G, 12 cm  Narrative        Tegaderm dressing used.       Complications: None apparent,      Arterial waveform: Yes

## 2021-12-27 NOTE — ANESTHESIA CARE TRANSFER NOTE
Patient: Satnam Ibarra    Procedure: Procedure(s):  TRANSCERVICAL EXTENDED MEDIASTINAL LYMPH NODE BIOPSIES       Diagnosis: Lung nodule [R91.1]  Diagnosis Additional Information: No value filed.    Anesthesia Type:   General     Note:    Oropharynx: oropharynx clear of all foreign objects  Level of Consciousness: awake and drowsy  Oxygen Supplementation: nasal cannula  Level of Supplemental Oxygen (L/min / FiO2): 2  Independent Airway: airway patency satisfactory and stable  Dentition: dentition unchanged  Vital Signs Stable: post-procedure vital signs reviewed and stable  Report to RN Given: handoff report given  Patient transferred to: PACU    Handoff Report: Identifed the Patient, Identified the Reponsible Provider, Reviewed the pertinent medical history, Discussed the surgical course, Reviewed Intra-OP anesthesia mangement and issues during anesthesia, Set expectations for post-procedure period and Allowed opportunity for questions and acknowledgement of understanding      Vitals:  Vitals Value Taken Time   /82 12/27/21 1416   Temp     Pulse 89 12/27/21 1422   Resp     SpO2 97 % 12/27/21 1422   Vitals shown include unvalidated device data.    Electronically Signed By: FLORENCIA Hunter CRNA  December 27, 2021  2:22 PM

## 2021-12-28 NOTE — ANESTHESIA POSTPROCEDURE EVALUATION
Patient: Satnam Ibarra    Procedure: Procedure(s):  TRANSCERVICAL EXTENDED MEDIASTINAL LYMPH NODE BIOPSIES       Diagnosis:Lung nodule [R91.1]  Diagnosis Additional Information: No value filed.    Anesthesia Type:  General    Note:  Disposition: Inpatient   Postop Pain Control: Uneventful            Sign Out: Well controlled pain   PONV: No   Neuro/Psych: Uneventful            Sign Out: Acceptable/Baseline neuro status   Airway/Respiratory: Uneventful            Sign Out: Acceptable/Baseline resp. status   CV/Hemodynamics: Uneventful            Sign Out: Acceptable CV status; No obvious hypovolemia; No obvious fluid overload   Other NRE: NONE   DID A NON-ROUTINE EVENT OCCUR? No           Last vitals:  Vitals Value Taken Time   /89 12/27/21 1753   Temp 36.6  C (97.8  F) 12/27/21 1753   Pulse 92 12/27/21 1753   Resp 14 12/27/21 1700   SpO2 97 % 12/27/21 1756   Vitals shown include unvalidated device data.    Electronically Signed By: Kirill Woodson MD  December 27, 2021  6:07 PM

## 2021-12-28 NOTE — OR NURSING
Pt has an oral temp of 99.9 and axillary temp of 98.9. Reported to Dr. Santizo who instructed to give pt an I.S. and have pt use it. Pt can discharge to home.

## 2021-12-28 NOTE — DISCHARGE INSTRUCTIONS
[facility]  Same-Day Surgery   Adult Discharge Orders & Instructions     For 24 hours after surgery    1. Get plenty of rest.  A responsible adult must stay with you for at least 24 hours after you leave the hospital.   2. Do not drive or use heavy equipment.  If you have weakness or tingling, don't drive or use heavy equipment until this feeling goes away.  3. Do not drink alcohol.  4. Avoid strenuous or risky activities.  Ask for help when climbing stairs.   5. You may feel lightheaded.  IF so, sit for a few minutes before standing.  Have someone help you get up.   6. If you have nausea (feel sick to your stomach): Drink only clear liquids such as apple juice, ginger ale, broth or 7-Up.  Rest may also help.  Be sure to drink enough fluids.  Move to a regular diet as you feel able.  7. You may have a slight fever. Call the doctor if your fever is over 100 F (37.7 C) (taken under the tongue) or lasts longer than 24 hours.  8. You may have a dry mouth, a sore throat, muscle aches or trouble sleeping.  These should go away after 24 hours.  9. Do not make important or legal decisions.   Call your doctor for any of the followin.  Signs of infection (fever, growing tenderness at the surgery site, a large amount of drainage or bleeding, severe pain, foul-smelling drainage, redness, swelling).    2. It has been over 8 to 10 hours since surgery and you are still not able to urinate (pass water).    3.  Headache for over 24 hours.      To contact a doctor, call Dr. Shyanne Eduardo @ [phone] (clinic) or [phone] (office) or:        [phone] and ask for the resident on call for   Thoracic surgery (answered 24 hours a day)      Emergency Department:    [facility]: [phone]       (TTY for hearing impaired: [phone])

## 2022-01-01 ENCOUNTER — HEALTH MAINTENANCE LETTER (OUTPATIENT)
Age: 57
End: 2022-01-01

## 2022-01-01 ENCOUNTER — TELEPHONE (OUTPATIENT)
Dept: SURGERY | Facility: CLINIC | Age: 57
End: 2022-01-01

## 2022-01-01 ENCOUNTER — PATIENT OUTREACH (OUTPATIENT)
Dept: ONCOLOGY | Facility: CLINIC | Age: 57
End: 2022-01-01
Payer: COMMERCIAL

## 2022-01-01 ENCOUNTER — VIRTUAL VISIT (OUTPATIENT)
Dept: SURGERY | Facility: CLINIC | Age: 57
End: 2022-01-01
Attending: CLINICAL NURSE SPECIALIST
Payer: COMMERCIAL

## 2022-01-01 ENCOUNTER — PRE VISIT (OUTPATIENT)
Dept: ONCOLOGY | Facility: CLINIC | Age: 57
End: 2022-01-01

## 2022-01-01 ENCOUNTER — PATIENT OUTREACH (OUTPATIENT)
Dept: SURGERY | Facility: CLINIC | Age: 57
End: 2022-01-01

## 2022-01-01 ENCOUNTER — TELEPHONE (OUTPATIENT)
Dept: SURGERY | Facility: CLINIC | Age: 57
End: 2022-01-01
Payer: COMMERCIAL

## 2022-01-01 ENCOUNTER — VIRTUAL VISIT (OUTPATIENT)
Dept: ONCOLOGY | Facility: CLINIC | Age: 57
End: 2022-01-01
Attending: CLINICAL NURSE SPECIALIST
Payer: COMMERCIAL

## 2022-01-01 DIAGNOSIS — C34.91 SARCOMATOID CARCINOMA OF LUNG, RIGHT (H): ICD-10-CM

## 2022-01-01 DIAGNOSIS — C34.90 NON-SMALL CELL LUNG CANCER, UNSPECIFIED LATERALITY (H): Primary | ICD-10-CM

## 2022-01-01 DIAGNOSIS — C34.91 SARCOMATOID CARCINOMA OF LUNG, RIGHT (H): Primary | ICD-10-CM

## 2022-01-01 LAB
PATH REPORT.COMMENTS IMP SPEC: ABNORMAL
PATH REPORT.COMMENTS IMP SPEC: YES
PATH REPORT.FINAL DX SPEC: ABNORMAL
PATH REPORT.GROSS SPEC: ABNORMAL
PATH REPORT.INTRAOP OBS SPEC DOC: ABNORMAL
PATH REPORT.MICROSCOPIC SPEC OTHER STN: ABNORMAL
PATH REPORT.RELEVANT HX SPEC: ABNORMAL
PHOTO IMAGE: ABNORMAL

## 2022-01-01 PROCEDURE — 99205 OFFICE O/P NEW HI 60 MIN: CPT | Mod: GT | Performed by: INTERNAL MEDICINE

## 2022-01-01 PROCEDURE — 99024 POSTOP FOLLOW-UP VISIT: CPT | Mod: 95 | Performed by: CLINICAL NURSE SPECIALIST

## 2022-01-06 NOTE — TELEPHONE ENCOUNTER
Call to Satnam Ibarra to discuss the recommendations from nodule conference. There was no answer. Message left with call back information.    Will also send a My Chart message.

## 2022-01-06 NOTE — PROGRESS NOTES
Review of Oncology referral fr Thoracic team for pt with lung cancer.    Pt with new lung cancer dx w mediastinal LAD at OSH. Referred to Thoracic Surgery at [facility].    12/27/2021 TEMLA at [facility] by Dr Eduardo, pathology confirms maligancy:  E. Lymph node, 4R No. 4, excision:  -Metastatic high-grade SMARCA4 deficient tumor (see comment)     F. Lymph node, 4R No. 4, excision:  -Metastatic high-grade SMARCA4 deficient tumor (see comment)     G. Lymph node, 4R No. 5, excision:  -Metastatic high-grade SMARCA4 deficient tumor (see comment)    PET done at [facility] 10/2021, CT chest at [facility] 12/2021.     Dr Deal would like to see this pt 1/19/22 Wed with new PET and baseline MRI Brain. Will call to offer appts.    Addendum 1/12: I spoke to [facility]'s [facility]  Melanie S (Phone: [phone] ext [phone]). Pt does not want PET-CT (NCAP) or MRI brain WITH contrast done at [facility], he would like to do these scans locally. Melanie will get local scans at [facility] or [facility] prior to 1/19 visit with MD or shortly after visit. They will push images to us and we will check Care Everywhere for reports.

## 2022-01-12 NOTE — OP NOTE
Preoperative diagnosis: Lung cancer   Postoperative diagnosis:  Lung cancer  Procedure:   Transcervical extended mediastinal lymphadenectomy (TEMLA)  Anesthesia: General  Surgeon: Shyanne Eduardo MD (present and participated in the entire procedure)  Surgeon(s):  Joby Oconnor MD Rao, Madhuri V, MD Rao, Madhuri V, MD  EBL: * No blood loss amount entered *  Case length: 4 Hr 7 Min 32 Sec    Complications: None immediate   Findings:  Abnormal level 7 and 4R nodes    Procedure  We placed Satnam Ibarra  in the supine position and made a 3 cm transverse supra-sternal incision. We entered the pretracheal plane and we dissected just underneath the sternum sufficiently to place the sternal elevator.  We then placed the TEMLA scope along the anterior aspect of the trachea into the mediastinum and proceeded with the lymphadenectomy. We completed the LN in 4R and we completed the LN in the subcarinal space.  Finally, we ensured hemostasis, removed the sternal elevator and closed with absorbable sutures in the conventional fashion.

## 2022-01-12 NOTE — TELEPHONE ENCOUNTER
RECORDS STATUS - ALL OTHER DIAGNOSIS      RECORDS RECEIVED FROM: [facility]   DATE RECEIVED: 01/18/22   NOTES STATUS DETAILS   OFFICE NOTE from referring provider Deborah Parra APRN CNS in [facility]   OFFICE NOTE from medical oncologist     DISCHARGE SUMMARY from hospital [facility] 12/27/21   DISCHARGE REPORT from the ER NA    OPERATIVE REPORT Epic/CE - [facility]  12/27/21: TRANSCERVICAL EXTENDED MEDIASTINAL LYMPH NODE BIOPSIES    [facility]  11/18/21: EBUS   MEDICATION LIST [facility]    CLINICAL TRIAL TREATMENTS TO DATE     LABS     PATHOLOGY REPORTS [facility] 12/27/21: Surg Path  12/23/21: Path Consult   ANYTHING RELATED TO DIAGNOSIS Epic 12/27/21   GENONOMIC TESTING     TYPE: Epic 12/27/21: NGS   IMAGING (NEED IMAGES & REPORT)     CT SCANS P[facility] 10[facility]21/21, [facility]21[facility]   MRI PACS 01/13/22: MR Brain   MAMMO     ULTRASOUND     PET PACS 10/28/21: [facility]  01/18/22: PET CT      Action January 18, 2022 2:20 PM ABT   Action Taken Called and unable to speak to Imaging team at [facility] for image MR Brain 01/13/22 to be pushed to  PACS.    Called and spoke to Cornelia at [facility], request for image PET CT Skull 01/18/22.Cornelia confirms that they will start working on image and have it pushed over to  PACS    4:28 PM  Images from [facility] and [facility] received and resolved to PACS

## 2022-01-19 NOTE — PROGRESS NOTES
Jose is a 56 year old who is being evaluated via a billable video visit.      How would you like to obtain your AVS? MyChart  If the video visit is dropped, the invitation should be resent by: Send to e-mail at: [email or web address]  Will anyone else be joining your video visit? Yesica BARRERA

## 2022-01-19 NOTE — PROGRESS NOTES
"CC:  SMARC4 deficient NSCLC    HPI:  Mr. Satnam Ibarra is a 56 year old man with a newly diagnosed lung cancer with mediastinal adenopathy. His main complaints of chest and neck pain had prompted a CT scan which led to the findings of the lung nodule and nodes. A bronchoscopic biopsy of the 4R node was diagnostic of cancer.  He is quite independent and functional. He does get dyspneic on exertion.   We discussed his case extensively at [facility] Tumor Board.  The decision to undergo a TEMLA was made and Dr. Eduardo completed this.  The patient is a current smoker.    The patient had a PET scan yesterday at [facility] in [place].  It appears to show stage 3 disease as the previous adrenal metastasis has \"normalized\" per the report.  MRI of the brain is negative as well.  I concur with these findings.    We spoke that stage 3 disease is best treated with concurrent chemoradiation followed by a year of durvalumab.  SMARC4 deficient tumors have responded well to immunotherapy and I believe this would be the best approach.  The patient is to have follow up at [place] with one of the oncologists there.    ROS:    10 point ROS is non-contributory except as above.    LAB:    CBC RESULTS: No results for input(s): WBC, RBC, HGB, HCT, MCV, MCH, MCHC, RDW, PLT in the last 03018 hours.  Last Comprehensive Metabolic Panel:  GLUCOSE BY METER POCT   Date Value Ref Range Status   12/27/2021 94 70 - 99 mg/dL Final     PATHOLOGY:    12/27/21    Case Report   Surgical Pathology Report                         Case: [id number]                                   Authorizing Provider:  Shyanne Eduardo MD         Collected:           12/27/2021 11:21 AM           Ordering Location:     [facility] MAIN OR                 Received:            12/27/2021 11:32 AM           Pathologist:           Lily Chase MD                                                                Intraop:               Elaine August MD                                   "                  Specimens:   A) - Lymph Node(s), Mediastinal, Regional, LEVEL 7                                                   B) - Lymph Node(s), 4R                                                                               C) - Lymph Node(s), 4R #2                                                                            D) - Lymph Node(s), 4R No. 3                                                                         E) - Lymph Node(s), 4R No. 4                                                                         F) - Lymph Node(s), 4R No. 4                                                                         G) - Lymph Node(s), 4R No. 5                                                               Final Diagnosis   A. Lymph nodes, level 7, excision:  -No malignancy identified     B. Lymph node, 4R, excision:  -No malignancy identified  -Hyalinized and calcified nodule, no definite organism identified with GMS and Mykel stains     C. Lymph node, 4R #2, excision:  -No malignancy identified   -Hyalinized and calcified nodule, no definite organism identified with GMS and Mykel stains     D. Lymph node, 4R No. 3, excision:  -No malignancy identified   -Hyalinized and calcified nodules with yeasts morphologically consistent with histoplasma species identified with GMS stain  -Mykel stain negative for mycobacteria     E. Lymph node, 4R No. 4, excision:  -Metastatic high-grade SMARCA4 deficient tumor (see comment)     F. Lymph node, 4R No. 4, excision:  -Metastatic high-grade SMARCA4 deficient tumor (see comment)     G. Lymph node, 4R No. 5, excision:  -Metastatic high-grade SMARCA4 deficient tumor (see comment)        IMAGING:    Recent Results (from the past 744 hour(s))   XR Chest Port 1 View    Narrative    XR CHEST PORT 1 VIEW  12/27/2021 3:02 PM      HISTORY: post-op pneumo. Transcervical extended mediastinal lymph node  biopsies.    COMPARISON: Chest CT 12/16/2021    FINDINGS:   AP view of the chest.  "Right clavicular screw and plate fixation.  Partially visualized cervical fusion hardware. Trachea is midline.  Cardiac silhouette is within normal limits. No pneumothorax or  significant pleural effusion. Unchanged right apical and right  suprahilar opacities consistent with known masses. No new focal  consolidation.      Impression    IMPRESSION:   No pneumothorax or other acute radiographic findings.    I have personally reviewed the examination and initial interpretation  and I agree with the findings.    VERNON ESTRADA DO         SYSTEM ID:  [id number]     Patient Name:   GARRETT ZELAYA    YOB: 1965    Procedure: PETCT SKULL TO THIGH FDG    Date of Service: 01/18/2022       EXAM:PETCT SKULL TO THIGH FDG     INDICATION:ICD-10 C34.80 Malignant neoplasm of overlapping sites of unspecified bronchus and lung   new dx high-grade sarcomatoid carcinoma of the lung, need updated PET/CT     IMPRESSION:   1.  Findings represents a mixed response to therapy.   2.  The presumed primary lesion, in the right suprahilar region, is significantly FDG avid, similar to the previous.   3.  Right apical nodule show similar size, but increase in the degree of FDG uptake. SUV maximum of 10 versus 5.2 on the prior study   4.  Pretracheal/precarinal metastases overall show a decrease in size. The metastasis at the level of the left brachiocephalic vein is smaller, but shows considerably more FDG activity on the current exam   5.  The right adrenal metastasis has normalized over the interval   6.  There is no new extrathoracic lesion   7.  Postsurgical changes compatible with recent mediastinoscopy       COMPARISON(S): 10/27/2021. Correlation with the 10/18/2021 chest CT.       MR BRAIN WO W CONTRAST     CLINICAL INFORMATION: \"New high grade malignancy, need to evaluate for CNS metastases\".     COMPARISON: None.     FINDINGS: There is no restricted diffusion in the brain. No hydrocephalus. Normal position of the " cerebellar tonsils. There is mild bilateral periventricular and subcortical white matter T2 hyperintensity which is nonspecific but most likely due to sequela of chronic small vessel ischemic disease given the patient's age. The normal major intracranial vascular flow voids are present. There is no intracranial hemorrhage, mass effect, or midline shift. There is no abnormal intracranial contrast enhancement.     There is a polyp versus mucus retention cyst in the left maxillary sinus. The globes and orbits are symmetric and grossly unremarkable. Nonspecific fluid in the right mastoid air cells.     IMPRESSION: No evidence of acute intracranial pathology such as hemorrhage or infarction. No brain metastases.       MED:    Current Outpatient Medications   Medication     acetaminophen (TYLENOL) 325 MG tablet     citalopram (CELEXA) 20 MG tablet     oxyCODONE (ROXICODONE) 5 MG tablet     No current facility-administered medications for this visit.     PMH:    Past Medical History:   Diagnosis Date     Anxiety      Depression      PSH:    Past Surgical History:   Procedure Laterality Date     BACK SURGERY       TRANSCERVICAL EXTENDED MEDIASTINAL LYMPHADENECTOMY N/A 12/27/2021    Procedure: TRANSCERVICAL EXTENDED MEDIASTINAL LYMPH NODE BIOPSIES;  Surgeon: Shyanne Eduardo MD;  Location:  OR     SOCIAL HX:    Social History     Socioeconomic History     Marital status: Single     Spouse name: Not on file     Number of children: Not on file     Years of education: Not on file     Highest education level: Not on file   Occupational History     Not on file   Tobacco Use     Smoking status: Current Every Day Smoker     Smokeless tobacco: Never Used   Substance and Sexual Activity     Alcohol use: Not Currently     Comment: Sober 2+ years     Drug use: Not on file     Sexual activity: Not on file   Other Topics Concern     Not on file   Social History Narrative     Not on file     Social Determinants of Health     Financial  Resource Strain: Not on file   Food Insecurity: Not on file   Transportation Needs: Not on file   Physical Activity: Not on file   Stress: Not on file   Social Connections: Not on file   Intimate Partner Violence: Not on file   Housing Stability: Not on file     FAMILY HX:    No family history on file.     PE:    There was no examination during this video visit.    A/P:    1) SMARC4 deficient NSCLC:  After discussions with my colleagues, I feel standard concurrent chemorads followed by 1 year of immunotherapy may be the best approach.  Weekly carboplatinum and Taxol would be a good choice, as would other platinum doublets that have been given with radiation.  The SMARC4 deficient cancers are thought to be more aggressive, although this patient's tumor has not changed much since diagnosis was made in October of 2021.    I spent over 60 minutes in discussions and review of this patient's records.

## 2022-01-19 NOTE — LETTER
"    1/19/2022         RE: Satnam Ibarra  [id number] [address] [address]        Dear Colleague,    Thank you for referring your patient, Satnam Ibarra, to the [facility]. Please see a copy of my visit note below.    CC:  SMARC4 deficient NSCLC    HPI:  Mr. Satnam Ibarra is a 56 year old man with a newly diagnosed lung cancer with mediastinal adenopathy. His main complaints of chest and neck pain had prompted a CT scan which led to the findings of the lung nodule and nodes. A bronchoscopic biopsy of the 4R node was diagnostic of cancer.  He is quite independent and functional. He does get dyspneic on exertion.   We discussed his case extensively at Thoracic Tumor Board.  The decision to undergo a TEMLA was made and Dr. Eduardo completed this.  The patient is a current smoker.    The patient had a PET scan yesterday at [facility] in [place].  It appears to show stage 3 disease as the previous adrenal metastasis has \"normalized\" per the report.  MRI of the brain is negative as well.  I concur with these findings.    We spoke that stage 3 disease is best treated with concurrent chemoradiation followed by a year of durvalumab.  SMARC4 deficient tumors have responded well to immunotherapy and I believe this would be the best approach.  [place].    ROS:    10 point ROS is non-contributory except as above.    LAB:    CBC RESULTS: No results for input(s): WBC, RBC, HGB, HCT, MCV, MCH, MCHC, RDW, PLT in the last 22041 hours.  Last Comprehensive Metabolic Panel:  GLUCOSE BY METER POCT   Date Value Ref Range Status   12/27/2021 94 70 - 99 mg/dL Final     PATHOLOGY:    12/27/21    Case Report   Surgical Pathology Report                         Case: [id number]                                   Authorizing Provider:  Shyanne Eduardo MD         Collected:           12/27/2021 11:21 AM           Ordering Location:     [facility] MAIN OR                 Received:            12/27/2021 11:32 AM           Pathologist:           Lily Chase MD                                                                Intraop:               Elaine August MD                                                    Specimens:   A) - Lymph Node(s), Mediastinal, Regional, LEVEL 7                                                   B) - Lymph Node(s), 4R                                                                               C) - Lymph Node(s), 4R #2                                                                            D) - Lymph Node(s), 4R No. 3                                                                         E) - Lymph Node(s), 4R No. 4                                                                         F) - Lymph Node(s), 4R No. 4                                                                         G) - Lymph Node(s), 4R No. 5                                                               Final Diagnosis   A. Lymph nodes, level 7, excision:  -No malignancy identified     B. Lymph node, 4R, excision:  -No malignancy identified  -Hyalinized and calcified nodule, no definite organism identified with GMS and Mykel stains     C. Lymph node, 4R #2, excision:  -No malignancy identified   -Hyalinized and calcified nodule, no definite organism identified with GMS and Mykel stains     D. Lymph node, 4R No. 3, excision:  -No malignancy identified   -Hyalinized and calcified nodules with yeasts morphologically consistent with histoplasma species identified with GMS stain  -Mykel stain negative for mycobacteria     E. Lymph node, 4R No. 4, excision:  -Metastatic high-grade SMARCA4 deficient tumor (see comment)     F. Lymph node, 4R No. 4, excision:  -Metastatic high-grade SMARCA4 deficient tumor (see comment)     G. Lymph node, 4R No. 5, excision:  -Metastatic high-grade SMARCA4 deficient tumor (see comment)        IMAGING:    Recent Results (from  the past 744 hour(s))   XR Chest Port 1 View    Narrative    XR CHEST PORT 1 VIEW  12/27/2021 3:02 PM      HISTORY: post-op pneumo. Transcervical extended mediastinal lymph node  biopsies.    COMPARISON: Chest CT 12/16/2021    FINDINGS:   AP view of the chest. Right clavicular screw and plate fixation.  Partially visualized cervical fusion hardware. Trachea is midline.  Cardiac silhouette is within normal limits. No pneumothorax or  significant pleural effusion. Unchanged right apical and right  suprahilar opacities consistent with known masses. No new focal  consolidation.      Impression    IMPRESSION:   No pneumothorax or other acute radiographic findings.    I have personally reviewed the examination and initial interpretation  and I agree with the findings.    VERNON ESTRADA DO         SYSTEM ID:  [id number]     Patient Name:   GARRETT ZELAYA    YOB: 1965    Procedure: PETCT SKULL TO THIGH FDG    Date of Service: 01/18/2022       EXAM:PETCT SKULL TO THIGH FDG     INDICATION:ICD-10 C34.80 Malignant neoplasm of overlapping sites of unspecified bronchus and lung   new dx high-grade sarcomatoid carcinoma of the lung, need updated PET/CT     IMPRESSION:   1.  Findings represents a mixed response to therapy.   2.  The presumed primary lesion, in the right suprahilar region, is significantly FDG avid, similar to the previous.   3.  Right apical nodule show similar size, but increase in the degree of FDG uptake. SUV maximum of 10 versus 5.2 on the prior study   4.  Pretracheal/precarinal metastases overall show a decrease in size. The metastasis at the level of the left brachiocephalic vein is smaller, but shows considerably more FDG activity on the current exam   5.  The right adrenal metastasis has normalized over the interval   6.  There is no new extrathoracic lesion   7.  Postsurgical changes compatible with recent mediastinoscopy       COMPARISON(S): 10/27/2021. Correlation with the 10/18/2021  "chest CT.       MR BRAIN WO W CONTRAST     CLINICAL INFORMATION: \"New high grade malignancy, need to evaluate for CNS metastases\".     COMPARISON: None.     FINDINGS: There is no restricted diffusion in the brain. No hydrocephalus. Normal position of the cerebellar tonsils. There is mild bilateral periventricular and subcortical white matter T2 hyperintensity which is nonspecific but most likely due to sequela of chronic small vessel ischemic disease given the patient's age. The normal major intracranial vascular flow voids are present. There is no intracranial hemorrhage, mass effect, or midline shift. There is no abnormal intracranial contrast enhancement.     There is a polyp versus mucus retention cyst in the left maxillary sinus. The globes and orbits are symmetric and grossly unremarkable. Nonspecific fluid in the right mastoid air cells.     IMPRESSION: No evidence of acute intracranial pathology such as hemorrhage or infarction. No brain metastases.       MED:    Current Outpatient Medications   Medication     acetaminophen (TYLENOL) 325 MG tablet     citalopram (CELEXA) 20 MG tablet     oxyCODONE (ROXICODONE) 5 MG tablet     No current facility-administered medications for this visit.     PMH:    Past Medical History:   Diagnosis Date     Anxiety      Depression      PSH:    Past Surgical History:   Procedure Laterality Date     BACK SURGERY       TRANSCERVICAL EXTENDED MEDIASTINAL LYMPHADENECTOMY N/A 12/27/2021    Procedure: TRANSCERVICAL EXTENDED MEDIASTINAL LYMPH NODE BIOPSIES;  Surgeon: Shyanne Eduardo MD;  Location:  OR     SOCIAL HX:    Social History     Socioeconomic History     Marital status: Single     Spouse name: Not on file     Number of children: Not on file     Years of education: Not on file     Highest education level: Not on file   Occupational History     Not on file   Tobacco Use     Smoking status: Current Every Day Smoker     Smokeless tobacco: Never Used   Substance and Sexual " Activity     Alcohol use: Not Currently     Comment: Sober 2+ years     Drug use: Not on file     Sexual activity: Not on file   Other Topics Concern     Not on file   Social History Narrative     Not on file     Social Determinants of Health     Financial Resource Strain: Not on file   Food Insecurity: Not on file   Transportation Needs: Not on file   Physical Activity: Not on file   Stress: Not on file   Social Connections: Not on file   Intimate Partner Violence: Not on file   Housing Stability: Not on file     FAMILY HX:    No family history on file.     PE:    There was no examination during this video visit.    A/P:    1) SMARC4 deficient NSCLC:  After discussions with my colleagues, I feel standard concurrent chemorads followed by 1 year of immunotherapy may be the best approach.  Weekly carboplatinum and Taxol would be a good choice, as would other platinum doublets that have been given with radiation.  The SMARC4 deficient cancers are thought to be more aggressive, although this patient's tumor has not changed much since diagnosis was made in October of 2021.    I spent over 60 minutes in discussions and review of this patient's records.    Jose is a 56 year old who is being evaluated via a billable video visit.      How would you like to obtain your AVS? MyChart  If the video visit is dropped, the invitation should be resent by: Send to e-mail at: [email or web address]  Will anyone else be joining your video visit? No     Zainab Figueroa VF          Again, thank you for allowing me to participate in the care of your patient.      Sincerely,    Poncho Deal MD

## 2022-01-26 NOTE — TELEPHONE ENCOUNTER
The following were faxed to [facility] (Attn:  Rosalind) at [phone].   Dr Eduardo new consult visit note, conference note, OP note, CT report, pathology results, pathology consultation, and Dr Deal note.     Addendum:  NGS Panel andNGS Fusion Profile Assay Faxed

## 2022-01-26 NOTE — LETTER
1/26/2022         RE: Satnam Ibarra  [id number] [address] [address]        Dear Colleague,    Thank you for referring your patient, Satnam Ibarra, to the [facility]. Please see a copy of my visit note below.    Jose is a 56 year old who is being evaluated via a billable telephone visit.      What phone number would you like to be contacted at? [phone]  How would you like to obtain your AVS? Mail a copy  Phone call duration: 20 minutes    THORACIC SURGERY FOLLOW UP VISIT    Dear Dr. Shyanne Eduardo,  I spoke by telephone to Mr. Ibarra in follow-up today. The clinical summary follows:     PREOP DIAGNOSIS   RUL lung nodule with mediastinal adenopathy      PROCEDURE   Transcervical extended mediastinal lymphadenectomy (TEMLA)    DATE OF PROCEDURE  12/27/21    HISTOPATHOLOGY   Final Diagnosis   A. Lymph nodes, level 7, excision:  -No malignancy identified     B. Lymph node, 4R, excision:  -No malignancy identified  -Hyalinized and calcified nodule, no definite organism identified with GMS and Mykel stains     C. Lymph node, 4R #2, excision:  -No malignancy identified   -Hyalinized and calcified nodule, no definite organism identified with GMS and Mykel stains     D. Lymph node, 4R No. 3, excision:  -No malignancy identified   -Hyalinized and calcified nodules with yeasts morphologically consistent with histoplasma species identified with GMS stain  -Mykel stain negative for mycobacteria     E. Lymph node, 4R No. 4, excision:  -Metastatic high-grade SMARCA4 deficient tumor (see comment)     F. Lymph node, 4R No. 4, excision:  -Metastatic high-grade SMARCA4 deficient tumor (see comment)     G. Lymph node, 4R No. 5, excision:  -Metastatic high-grade SMARCA4 deficient tumor (see comment)       COMPLICATIONS  None    INTERVAL STUDIES  None    SUBJECTIVE   I'm doing OK, I guess.   The neck incision has healed well with no drainage, no reddness.    IMPRESSION NSCLC    Jose is a 56  year old male who was recently diagnosed with a non-small cell lung cancer, SMARCA4 deficient undifferentiated tumor , stage 3.  He met with Dr. Deal (medical oncology) recently and it was recommended that he undergo concurrent chemoradiation followed by a year of durvalumab.  He has opted to receive this care through [facility] in [place], [place].          Jose expressed some anxiety that he has not been called by this clinic to arrange consultation and treatment.   I called their referral line [phone] and spoke to Rosalind, will fax records and demographics to her today at fax #[phone].  I asked our film room to push the recent chest CT scan to them, as well.   She indicated that, if pathology slides are needed, they will request them directly from our pathology lab.  She will let me know once they arrange a consultation with Jose.  Jose appreciated this information and will call us if he has any surgery related concerns or questions.    PLAN  I spent 20 min on the date of the encounter in chart review, patient visit, review of tests, documentation and/or discussion with other providers about the issues documented above. I reviewed the plan as follows:  Call with any new questions or concerns related to this surgery  1. Necessary Tests & Appointments: Referral made to [facility] in [place], [place], records faxed, images pushed.  2. Pain Control Plan: n/a  3. Anticoagulation Plan: n/a  4. Smoking Cessation: n/a    All questions were answered and the patient and present family were in agreement with the plan.  I appreciate the opportunity to participate in the care of your patient and will keep you updated.      Again, thank you for allowing me to participate in the care of your patient.      Sincerely,    FLORENCIA Story CNS

## 2022-01-26 NOTE — PROGRESS NOTES
Jose is a 56 year old who is being evaluated via a billable telephone visit.      What phone number would you like to be contacted at? [phone]  How would you like to obtain your AVS? Mail a copy  Phone call duration: 20 minutes    THORACIC SURGERY FOLLOW UP VISIT    Dear Dr. Shyanne Eduardo,  I spoke by telephone to Mr. Ibarra in follow-up today. The clinical summary follows:     PREOP DIAGNOSIS   RUL lung nodule with mediastinal adenopathy      PROCEDURE   Transcervical extended mediastinal lymphadenectomy (TEMLA)    DATE OF PROCEDURE  12/27/21    HISTOPATHOLOGY   Final Diagnosis   A. Lymph nodes, level 7, excision:  -No malignancy identified     B. Lymph node, 4R, excision:  -No malignancy identified  -Hyalinized and calcified nodule, no definite organism identified with GMS and Mykel stains     C. Lymph node, 4R #2, excision:  -No malignancy identified   -Hyalinized and calcified nodule, no definite organism identified with GMS and Mykel stains     D. Lymph node, 4R No. 3, excision:  -No malignancy identified   -Hyalinized and calcified nodules with yeasts morphologically consistent with histoplasma species identified with GMS stain  -Mykel stain negative for mycobacteria     E. Lymph node, 4R No. 4, excision:  -Metastatic high-grade SMARCA4 deficient tumor (see comment)     F. Lymph node, 4R No. 4, excision:  -Metastatic high-grade SMARCA4 deficient tumor (see comment)     G. Lymph node, 4R No. 5, excision:  -Metastatic high-grade SMARCA4 deficient tumor (see comment)       COMPLICATIONS  None    INTERVAL STUDIES  None    SUBJECTIVE   I'm doing OK, I guess.   The neck incision has healed well with no drainage, no reddness.    IMPRESSION NSCLC    Jose is a 56 year old male who was recently diagnosed with a non-small cell lung cancer, SMARCA4 deficient undifferentiated tumor , stage 3.  He met with Dr. Deal (medical oncology) recently and it was recommended that he undergo concurrent chemoradiation followed by a  year of durvalumab.  He has opted to receive this care through [facility] in [place], [place].          Jose expressed some anxiety that he has not been called by this clinic to arrange consultation and treatment.   I called their referral line [phone] and spoke to Rosalind, will fax records and demographics to her today at fax #[phone].  I asked our film room to push the recent chest CT scan to them, as well.   She indicated that, if pathology slides are needed, they will request them directly from our pathology lab.  She will let me know once they arrange a consultation with Jose.  Jose appreciated this information and will call us if he has any surgery related concerns or questions.    PLAN  I spent 20 min on the date of the encounter in chart review, patient visit, review of tests, documentation and/or discussion with other providers about the issues documented above. I reviewed the plan as follows:  Call with any new questions or concerns related to this surgery  1. Necessary Tests & Appointments: Referral made to [facility] in [place], [place], records faxed, images pushed.  2. Pain Control Plan: n/a  3. Anticoagulation Plan: n/a  4. Smoking Cessation: n/a    All questions were answered and the patient and present family were in agreement with the plan.  I appreciate the opportunity to participate in the care of your patient and will keep you updated.  Sincerely,  FLORENCIA Islas, CNS

## 2022-01-27 NOTE — TELEPHONE ENCOUNTER
I had a VM from a nurse navigator at [facility] in [place], [place] telling me they had arrange a consultation there next Wed, 2/2.

## (undated) DEVICE — RX SURGIFLO HEMOSTATIC MATRIX W/THROMBIN 8ML 2994

## (undated) DEVICE — SPONGE PACK VAGINAL 2"X9

## (undated) DEVICE — SU SILK 0 SH 30" K834H

## (undated) DEVICE — SU VICRYL 2-0 SH 27" UND J417H

## (undated) DEVICE — BLADE SAW STERNAL 20X30MM KM-32

## (undated) DEVICE — ESU GROUND PAD ADULT W/CORD E7507

## (undated) DEVICE — LINEN TOWEL PACK X5 5464

## (undated) DEVICE — DRAPE MAYO STAND 23X54 8337

## (undated) DEVICE — SU MONOCRYL 4-0 PS-2 27" UND Y426H

## (undated) DEVICE — DRAPE SHEET REV FOLD 3/4 9349

## (undated) DEVICE — DRSG STERI STRIP 1/4X4" R1546

## (undated) DEVICE — SURGICEL ABSORBABLE HEMOSTAT SNOW 2"X4" 2082

## (undated) DEVICE — CUP AND LID 2PK 2OZ STERILE  SSK9006A

## (undated) DEVICE — ESU LIGASURE MARYLAND LAPAROSCOPIC SLR/DVDR 5MMX37CM LF1937

## (undated) DEVICE — PACK POST OP HEART

## (undated) DEVICE — TUBING PRESSURE M/F CONNECTOR 12" 50P112

## (undated) DEVICE — ESU ELEC BLADE 2.75" COATED/INSULATED E1455

## (undated) DEVICE — ENDO APPLICATOR SURGIFLO PLASMA COBLATION MS1995

## (undated) DEVICE — DRAPE U SPLIT 74X120" 29440

## (undated) DEVICE — SWAB PROCTO 16" 2/PK 32-046

## (undated) DEVICE — ADH SKIN CLOSURE PREMIERPRO EXOFIN 1.0ML 3470

## (undated) DEVICE — SPONGE TONSIL W/STRING MED 23275-680

## (undated) DEVICE — LINEN GOWN XLG 5407

## (undated) DEVICE — SURGICEL HEMOSTAT 4X8" 1952

## (undated) DEVICE — DRAPE IOBAN INCISE 23X17" 6650EZ

## (undated) DEVICE — ESU ELEC BLADE 6" COATED/INSULATED E1455-6

## (undated) DEVICE — DRSG TELFA 3X8" 1238

## (undated) RX ORDER — CEFAZOLIN SODIUM 2 G/100ML
INJECTION, SOLUTION INTRAVENOUS
Status: DISPENSED
Start: 2021-01-01

## (undated) RX ORDER — HYDROMORPHONE HCL IN WATER/PF 6 MG/30 ML
PATIENT CONTROLLED ANALGESIA SYRINGE INTRAVENOUS
Status: DISPENSED
Start: 2021-01-01

## (undated) RX ORDER — ROCURONIUM BROMIDE 50 MG/5 ML
SYRINGE (ML) INTRAVENOUS
Status: DISPENSED
Start: 2021-01-01

## (undated) RX ORDER — FENTANYL CITRATE-0.9 % NACL/PF 10 MCG/ML
PLASTIC BAG, INJECTION (ML) INTRAVENOUS
Status: DISPENSED
Start: 2021-01-01

## (undated) RX ORDER — FENTANYL CITRATE 50 UG/ML
INJECTION, SOLUTION INTRAMUSCULAR; INTRAVENOUS
Status: DISPENSED
Start: 2021-01-01

## (undated) RX ORDER — LIDOCAINE HYDROCHLORIDE 20 MG/ML
INJECTION, SOLUTION EPIDURAL; INFILTRATION; INTRACAUDAL; PERINEURAL
Status: DISPENSED
Start: 2021-01-01

## (undated) RX ORDER — EPHEDRINE SULFATE 50 MG/ML
INJECTION, SOLUTION INTRAMUSCULAR; INTRAVENOUS; SUBCUTANEOUS
Status: DISPENSED
Start: 2021-01-01

## (undated) RX ORDER — ONDANSETRON 2 MG/ML
INJECTION INTRAMUSCULAR; INTRAVENOUS
Status: DISPENSED
Start: 2021-01-01

## (undated) RX ORDER — CEFAZOLIN SODIUM 1 G/3ML
INJECTION, POWDER, FOR SOLUTION INTRAMUSCULAR; INTRAVENOUS
Status: DISPENSED
Start: 2021-01-01

## (undated) RX ORDER — ACETAMINOPHEN 325 MG/1
TABLET ORAL
Status: DISPENSED
Start: 2021-01-01

## (undated) RX ORDER — OXYCODONE HYDROCHLORIDE 5 MG/1
TABLET ORAL
Status: DISPENSED
Start: 2021-01-01

## (undated) RX ORDER — PROPOFOL 10 MG/ML
INJECTION, EMULSION INTRAVENOUS
Status: DISPENSED
Start: 2021-01-01

## (undated) RX ORDER — BUPIVACAINE HYDROCHLORIDE 2.5 MG/ML
INJECTION, SOLUTION EPIDURAL; INFILTRATION; INTRACAUDAL
Status: DISPENSED
Start: 2021-01-01

## (undated) RX ORDER — DEXAMETHASONE SODIUM PHOSPHATE 4 MG/ML
INJECTION, SOLUTION INTRA-ARTICULAR; INTRALESIONAL; INTRAMUSCULAR; INTRAVENOUS; SOFT TISSUE
Status: DISPENSED
Start: 2021-01-01

## (undated) RX ORDER — GLYCOPYRROLATE 0.2 MG/ML
INJECTION, SOLUTION INTRAMUSCULAR; INTRAVENOUS
Status: DISPENSED
Start: 2021-01-01